# Patient Record
Sex: MALE | Race: WHITE | NOT HISPANIC OR LATINO | Employment: OTHER | URBAN - METROPOLITAN AREA
[De-identification: names, ages, dates, MRNs, and addresses within clinical notes are randomized per-mention and may not be internally consistent; named-entity substitution may affect disease eponyms.]

---

## 2023-08-17 ENCOUNTER — HOSPITAL ENCOUNTER (EMERGENCY)
Facility: HOSPITAL | Age: 53
Discharge: HOME | End: 2023-08-18
Attending: EMERGENCY MEDICINE
Payer: COMMERCIAL

## 2023-08-17 DIAGNOSIS — M79.604 BILATERAL LEG PAIN: Primary | ICD-10-CM

## 2023-08-17 DIAGNOSIS — M79.605 BILATERAL LEG PAIN: Primary | ICD-10-CM

## 2023-08-17 LAB
ALBUMIN SERPL-MCNC: 4.3 G/DL (ref 3.5–5.2)
ALBUMIN/GLOB SERPL: 2.3 G/DL
ALP SERPL-CCNC: 80 U/L (ref 39–117)
ALT SERPL W P-5'-P-CCNC: 29 U/L (ref 1–41)
ANION GAP SERPL CALCULATED.3IONS-SCNC: 9 MMOL/L (ref 5–15)
AST SERPL-CCNC: 26 U/L (ref 1–40)
BASOPHILS # BLD AUTO: 0.09 10*3/MM3 (ref 0–0.2)
BASOPHILS NFR BLD AUTO: 0.8 % (ref 0–1.5)
BILIRUB SERPL-MCNC: 0.9 MG/DL (ref 0–1.2)
BUN SERPL-MCNC: 20 MG/DL (ref 6–20)
BUN/CREAT SERPL: 22.2 (ref 7–25)
CALCIUM SPEC-SCNC: 9.1 MG/DL (ref 8.6–10.5)
CHLORIDE SERPL-SCNC: 103 MMOL/L (ref 98–107)
CK SERPL-CCNC: 273 U/L (ref 20–200)
CO2 SERPL-SCNC: 28 MMOL/L (ref 22–29)
CREAT SERPL-MCNC: 0.9 MG/DL (ref 0.76–1.27)
DEPRECATED RDW RBC AUTO: 42.4 FL (ref 37–54)
EGFRCR SERPLBLD CKD-EPI 2021: 101.5 ML/MIN/1.73
EOSINOPHIL # BLD AUTO: 0.1 10*3/MM3 (ref 0–0.4)
EOSINOPHIL NFR BLD AUTO: 0.9 % (ref 0.3–6.2)
ERYTHROCYTE [DISTWIDTH] IN BLOOD BY AUTOMATED COUNT: 12.5 % (ref 12.3–15.4)
ETHANOL BLD-MCNC: <10 MG/DL (ref 0–10)
ETHANOL UR QL: <0.01 %
GLOBULIN UR ELPH-MCNC: 1.9 GM/DL
GLUCOSE SERPL-MCNC: 103 MG/DL (ref 65–99)
HCT VFR BLD AUTO: 39 % (ref 37.5–51)
HGB BLD-MCNC: 13.6 G/DL (ref 13–17.7)
IMM GRANULOCYTES # BLD AUTO: 0.05 10*3/MM3 (ref 0–0.05)
IMM GRANULOCYTES NFR BLD AUTO: 0.4 % (ref 0–0.5)
LYMPHOCYTES # BLD AUTO: 1.26 10*3/MM3 (ref 0.7–3.1)
LYMPHOCYTES NFR BLD AUTO: 11.3 % (ref 19.6–45.3)
MCH RBC QN AUTO: 32.8 PG (ref 26.6–33)
MCHC RBC AUTO-ENTMCNC: 34.9 G/DL (ref 31.5–35.7)
MCV RBC AUTO: 94 FL (ref 79–97)
MONOCYTES # BLD AUTO: 1.16 10*3/MM3 (ref 0.1–0.9)
MONOCYTES NFR BLD AUTO: 10.4 % (ref 5–12)
NEUTROPHILS NFR BLD AUTO: 76.2 % (ref 42.7–76)
NEUTROPHILS NFR BLD AUTO: 8.51 10*3/MM3 (ref 1.7–7)
NRBC BLD AUTO-RTO: 0 /100 WBC (ref 0–0.2)
PLATELET # BLD AUTO: 267 10*3/MM3 (ref 140–450)
PMV BLD AUTO: 10.5 FL (ref 6–12)
POTASSIUM SERPL-SCNC: 3.3 MMOL/L (ref 3.5–5.2)
PROT SERPL-MCNC: 6.2 G/DL (ref 6–8.5)
RBC # BLD AUTO: 4.15 10*6/MM3 (ref 4.14–5.8)
SODIUM SERPL-SCNC: 140 MMOL/L (ref 136–145)
WBC NRBC COR # BLD: 11.17 10*3/MM3 (ref 3.4–10.8)

## 2023-08-17 PROCEDURE — 82550 ASSAY OF CK (CPK): CPT | Performed by: PHYSICIAN ASSISTANT

## 2023-08-17 PROCEDURE — 85025 COMPLETE CBC W/AUTO DIFF WBC: CPT | Performed by: PHYSICIAN ASSISTANT

## 2023-08-17 PROCEDURE — 99283 EMERGENCY DEPT VISIT LOW MDM: CPT

## 2023-08-17 PROCEDURE — 82077 ASSAY SPEC XCP UR&BREATH IA: CPT | Performed by: PHYSICIAN ASSISTANT

## 2023-08-17 PROCEDURE — 80053 COMPREHEN METABOLIC PANEL: CPT | Performed by: PHYSICIAN ASSISTANT

## 2023-08-17 RX ADMIN — SODIUM CHLORIDE 1000 ML: 9 INJECTION, SOLUTION INTRAVENOUS at 22:17

## 2023-08-18 VITALS
SYSTOLIC BLOOD PRESSURE: 114 MMHG | OXYGEN SATURATION: 97 % | DIASTOLIC BLOOD PRESSURE: 68 MMHG | TEMPERATURE: 99.1 F | RESPIRATION RATE: 18 BRPM | HEART RATE: 90 BPM | BODY MASS INDEX: 23.86 KG/M2 | WEIGHT: 180 LBS | HEIGHT: 73 IN

## 2023-08-18 NOTE — ED PROVIDER NOTES
EMERGENCY DEPARTMENT ENCOUNTER    Room Number:  03/03  PCP: Provider, No Known  Discussed/ obtained information from independent historians: patient      HPI:  Chief Complaint: leg pain  A complete HPI/ROS/PMH/PSH/SH/FH are unobtainable due to: vague historian  Context: Nando Owen is a 53 y.o. male who presents to the ED c/o bilateral lower extremity pain and fatigue.  Patient is a vague historian.  He tells me he recently moved here from [place] and is a .  He is not currently working.  He denies being homeless, states he is staying in an apartment that his parents own.  He states today he walked [place] miles [place] the shopping [place] and got about half mile from home and felt so fatigued and having bilateral leg pain that he had to sit down.  A bystander called EMS for him and he presents to the ER.  He states has been walking a lot lately.  He admits to marijuana use and denies any alcohol use or any other drug use.  He denies any known medical problems.  He denies any chest pain shortness of breath fever chills urinary symptoms upper respiratory symptoms or other complaints at this time.      External (non-ED) record review: Urgent care visit in 2016, December, for eczema.      PAST MEDICAL HISTORY  Active Ambulatory Problems     Diagnosis Date Noted    No Active Ambulatory Problems     Resolved Ambulatory Problems     Diagnosis Date Noted    No Resolved Ambulatory Problems     Past Medical History:   Diagnosis Date    Anxiety     Anxiety          PAST SURGICAL HISTORY  No past surgical history on file.      FAMILY HISTORY  No family history on file.      SOCIAL HISTORY  Social History     Socioeconomic History    Marital status: Single   Tobacco Use    Smoking status: Some Days   Substance and Sexual Activity    Alcohol use: No         ALLERGIES  Patient has no known allergies.        REVIEW OF SYSTEMS  Review of Systems         PHYSICAL EXAM  ED Triage Vitals [08/17/23 2132]   Temp Heart  Rate Resp BP SpO2   99.1 øF (37.3 øC) 82 18 126/73 97 %      Temp src Heart Rate Source Patient Position BP Location FiO2 (%)   Temporal Monitor -- -- --       Physical Exam      GENERAL: no acute distress  HENT: normocephalic, atraumatic  EYES: no scleral icterus  CV: regular rhythm, normal rate  RESPIRATORY: normal effort CTA B  ABDOMEN: nondistended, soft, nontender, normal bowel sounds, no guarding or rigidity  MUSCULOSKELETAL: no deformity.  Legs are unremarkable in appearance, feet are warm pink and dry.  DP PT pulses 2+ bilaterally, cap refill brisk and sensation intact distally.  Calves are nontender.  Compartments soft.  NEURO: alert, moves all extremities, follows commands  PSYCH:  calm, cooperative  SKIN: warm, dry    Vital signs and nursing notes reviewed.          LAB RESULTS  Recent Results (from the past 24 hour(s))   CBC Auto Differential    Collection Time: 08/17/23 10:17 PM    Specimen: Blood   Result Value Ref Range    WBC 11.17 (H) 3.40 - 10.80 10*3/mm3    RBC 4.15 4.14 - 5.80 10*6/mm3    Hemoglobin 13.6 13.0 - 17.7 g/dL    Hematocrit 39.0 37.5 - 51.0 %    MCV 94.0 79.0 - 97.0 fL    MCH 32.8 26.6 - 33.0 pg    MCHC 34.9 31.5 - 35.7 g/dL    RDW 12.5 12.3 - 15.4 %    RDW-SD 42.4 37.0 - 54.0 fl    MPV 10.5 6.0 - 12.0 fL    Platelets 267 140 - 450 10*3/mm3    Neutrophil % 76.2 (H) 42.7 - 76.0 %    Lymphocyte % 11.3 (L) 19.6 - 45.3 %    Monocyte % 10.4 5.0 - 12.0 %    Eosinophil % 0.9 0.3 - 6.2 %    Basophil % 0.8 0.0 - 1.5 %    Immature Grans % 0.4 0.0 - 0.5 %    Neutrophils, Absolute 8.51 (H) 1.70 - 7.00 10*3/mm3    Lymphocytes, Absolute 1.26 0.70 - 3.10 10*3/mm3    Monocytes, Absolute 1.16 (H) 0.10 - 0.90 10*3/mm3    Eosinophils, Absolute 0.10 0.00 - 0.40 10*3/mm3    Basophils, Absolute 0.09 0.00 - 0.20 10*3/mm3    Immature Grans, Absolute 0.05 0.00 - 0.05 10*3/mm3    nRBC 0.0 0.0 - 0.2 /100 WBC       Ordered the above labs and reviewed the  results.                PROCEDURES  Procedures              MEDICATIONS GIVEN IN ER  Medications   sodium chloride 0.9 % bolus 1,000 mL (1,000 mL Intravenous New Bag 8/17/23 2217)                   MEDICAL DECISION MAKING, PROGRESS, and CONSULTS    All labs have been independently reviewed by me.  All radiology studies have been reviewed by me and I have also reviewed the radiology report.   EKG's independently viewed and interpreted by me.  Discussion below represents my analysis of pertinent findings related to patient's condition, differential diagnosis, treatment plan and final disposition.            Orders placed during this visit:  Orders Placed This Encounter   Procedures    Comprehensive Metabolic Panel    CK    Urinalysis With Microscopic If Indicated (No Culture) - Urine, Clean Catch    Ethanol    Urine Drug Screen - Urine, Clean Catch    CBC Auto Differential    CBC & Differential           Differential diagnosis:  Rhabdomyolysis, muscle strain, dehydration, electrolyte abnormality      Independent interpretation of labs, radiology studies, and discussions with consultants:  ED Course as of 08/22/23 0123   u Aug 17, 2023   2306 WBC(!): 11.17 [KA]   2306 Hemoglobin: 13.6 [KA]   2329 Patient labs are pending.  Course of care assumed by Dr. Bee at this time. [KA]   Fri Aug 18, 2023   0020 00:20 EDT  Patient with bilateral leg pain.  No evidence of rhabdomyolysis.  No evidence of fracture.  Patient will be discharged home.  Instructed to follow-up with primary doctor [SL]      ED Course User Index  [KA] Jacqueline Vernon PA  [SL] Clark Bee MD       - Shared decision making: Recommended labs and IV fluids for further evaluation patient is agreeable      Additional sources:    - Chronic or social conditions impacting care: Unemployed, no transportation          DIAGNOSIS  Final diagnoses:   Bilateral leg pain           Follow Up:  [facility] - [facility]  [id number]  [phone]  Schedule an appointment as soon as possible for a visit         RX:     Medication List      No changes were made to your prescriptions during this visit.         Latest Documented Vital Signs:  As of 23:30 EDT  BP- 119/78 HR- 74 Temp- 99.1 øF (37.3 øC) (Temporal) O2 sat- 96%              --    Please note that portions of this were completed with a voice recognition program.       Note Disclaimer: At [facility], we believe that sharing information builds trust and better relationships. You are receiving this note because you are receiving care at [facility] or recently visited. It is possible you will see health information before a provider has talked with you about it. This kind of information can be easy to misunderstand. To help you fully understand what it means for your health, we urge you to discuss this note with your provider.             Jacqueline Vernon PA  08/22/23 0123

## 2023-08-18 NOTE — ED PROVIDER NOTES
MD ATTESTATION NOTE    The IDA and I have discussed this patient's history, physical exam, and treatment plan.  I have reviewed the documentation and personally had a face to face interaction with the patient. I affirm the documentation and agree with the treatment and plan.  The attached note describes my personal findings.      I provided a substantive portion of the care of the patient.  I personally performed the physical exam in its entirety, and below are my findings.     Brief HPI: Patient presents for evaluation of bilateral leg pain.  Patient states he has been walking a lot lately.  Has had increasing leg pain and stiffness.  Patient has no chest pain pressure tightness.  Has had no vomiting or diarrhea.    PHYSICAL EXAM  ED Triage Vitals [08/17/23 2132]   Temp Heart Rate Resp BP SpO2   99.1 øF (37.3 øC) 82 18 126/73 97 %      Temp src Heart Rate Source Patient Position BP Location FiO2 (%)   Temporal Monitor -- -- --         GENERAL: no acute distress  HENT: nares patent  EYES: no scleral icterus  CV: regular rhythm, normal rate  RESPIRATORY: normal effort  ABDOMEN: soft  MUSCULOSKELETAL: no deformity.  Tenderness to bilateral lower extremities  NEURO: alert, moves all extremities, follows commands  PSYCH:  calm, cooperative  SKIN: warm, dry    Vital signs and nursing notes reviewed.        Plan: Lab evaluation IV fluids       Clark Bee MD  08/17/23 0284

## 2023-08-18 NOTE — ED NOTES
"Pt has abrasion and blisters present to both feet that he states are from \"walking everywhere\"   "

## 2023-08-18 NOTE — ED NOTES
Patient arrived to ED via EMS stretcher with complaints of bilateral  leg pain.  Pt AA&Ox4, reports that he was walking home and got tired and stopped to rest. Pt also states that he felt disoriented.